# Patient Record
Sex: MALE | Race: WHITE | Employment: OTHER | URBAN - METROPOLITAN AREA
[De-identification: names, ages, dates, MRNs, and addresses within clinical notes are randomized per-mention and may not be internally consistent; named-entity substitution may affect disease eponyms.]

---

## 2019-07-02 ENCOUNTER — NURSING HOME VISIT (OUTPATIENT)
Dept: GERIATRICS | Facility: CLINIC | Age: 84
End: 2019-07-02
Payer: MEDICARE

## 2019-07-02 VITALS
TEMPERATURE: 98.5 F | OXYGEN SATURATION: 95 % | BODY MASS INDEX: 23.2 KG/M2 | DIASTOLIC BLOOD PRESSURE: 78 MMHG | HEIGHT: 67 IN | WEIGHT: 147.8 LBS | RESPIRATION RATE: 20 BRPM | HEART RATE: 76 BPM | SYSTOLIC BLOOD PRESSURE: 131 MMHG

## 2019-07-02 DIAGNOSIS — R53.81 DEBILITY: ICD-10-CM

## 2019-07-02 DIAGNOSIS — N40.0 BENIGN PROSTATIC HYPERPLASIA WITHOUT LOWER URINARY TRACT SYMPTOMS: ICD-10-CM

## 2019-07-02 DIAGNOSIS — R33.8 ACUTE RETENTION OF URINE: ICD-10-CM

## 2019-07-02 DIAGNOSIS — Z97.8 FOLEY CATHETER IN PLACE: ICD-10-CM

## 2019-07-02 DIAGNOSIS — Z93.3 S/P COLOSTOMY (H): ICD-10-CM

## 2019-07-02 DIAGNOSIS — I10 ESSENTIAL HYPERTENSION: ICD-10-CM

## 2019-07-02 DIAGNOSIS — G89.18 ACUTE POST-OPERATIVE PAIN: ICD-10-CM

## 2019-07-02 DIAGNOSIS — Z98.890 S/P ROBOT-ASSISTED SURGICAL PROCEDURE: ICD-10-CM

## 2019-07-02 DIAGNOSIS — C80.1 SIGNET RING CELL ADENOCARCINOMA (H): Primary | ICD-10-CM

## 2019-07-02 PROCEDURE — 99310 SBSQ NF CARE HIGH MDM 45: CPT | Performed by: NURSE PRACTITIONER

## 2019-07-02 RX ORDER — ONDANSETRON 4 MG/1
4 TABLET, FILM COATED ORAL EVERY 6 HOURS PRN
Start: 2019-07-02

## 2019-07-02 RX ORDER — HYDROCHLOROTHIAZIDE 25 MG/1
25 TABLET ORAL DAILY
COMMUNITY

## 2019-07-02 RX ORDER — ASPIRIN 81 MG/1
81 TABLET ORAL DAILY
COMMUNITY

## 2019-07-02 RX ORDER — ACETAMINOPHEN 500 MG
1000 TABLET ORAL EVERY 6 HOURS PRN
COMMUNITY

## 2019-07-02 RX ORDER — PRAVASTATIN SODIUM 10 MG
20 TABLET ORAL DAILY
COMMUNITY

## 2019-07-02 RX ORDER — LOSARTAN POTASSIUM 100 MG/1
100 TABLET ORAL DAILY
COMMUNITY

## 2019-07-02 RX ORDER — VERAPAMIL HYDROCHLORIDE 120 MG/1
120 TABLET ORAL EVERY 12 HOURS
COMMUNITY

## 2019-07-02 RX ORDER — SENNOSIDES 8.6 MG
1 TABLET ORAL 2 TIMES DAILY
Start: 2019-07-02 | End: 2019-07-08

## 2019-07-02 RX ORDER — POLYETHYLENE GLYCOL 3350 17 G/17G
1 POWDER, FOR SOLUTION ORAL DAILY PRN
COMMUNITY

## 2019-07-02 RX ORDER — MULTIPLE VITAMINS W/ MINERALS TAB 9MG-400MCG
1 TAB ORAL DAILY
COMMUNITY

## 2019-07-02 RX ORDER — OMEPRAZOLE 40 MG/1
40 CAPSULE, DELAYED RELEASE ORAL DAILY
COMMUNITY

## 2019-07-02 RX ORDER — TAMSULOSIN HYDROCHLORIDE 0.4 MG/1
0.4 CAPSULE ORAL DAILY
COMMUNITY

## 2019-07-02 ASSESSMENT — MIFFLIN-ST. JEOR: SCORE: 1289.05

## 2019-07-02 NOTE — PROGRESS NOTES
[place] [facility]  PRIMARY CARE PROVIDER AND CLINIC:  Physician No Ref-Primary, No address on file  Chief Complaint   Patient presents with     Hospital F/U     [place] Medical Record Number:  [id number]  Place of Service where encounter took place:  [facility] ([facility] [[id number]]    David CISNEROS Peña  is a 89 year old  (7/23/1928), admitted to the above facility from  [facility] . Hospital stay 6/25/2019 through 7/1/2019..  Admitted to this facility for  rehab, medical management and nursing care.    HPI:    HPI information obtained from: facility chart records, facility staff, patient report and [facility] chart review.     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  Met with several teams and underwent Robotic assisted abdominal perirectal tumor resection surgery and creation of a end sigmoid colostomy.  He has been doing well, thus has transitioned to the TCU for ongoing cares and PT/OT.     Updates on Status Since Skilled nursing Admission: In meeting Dr. Pompa and his son/family today, he reports he feels well.  He reports minimal surgical pain in the perirectal area, minimal surgical pain with the colostomy.  He reports he wishes he did not need the Cates but reported he failed a voiding trial in the hospital.  He reports also he has had extensive gas in his colostomy but minimal to no stool output since surgery but denies any N/V or abdominal pain.  Did mention he underwent a bowel prep prior to surgery.  No other medical complaints.     CODE STATUS/ADVANCE DIRECTIVES DISCUSSION:   DNR/DNI.   Patient's living condition: lives with spouse in [facility]    ALLERGIES: Adhesive tape and Lisinopril  PAST MEDICAL HISTORY:  has no past medical  "history on file.  PAST SURGICAL HISTORY:   has no past surgical history on file.  FAMILY HISTORY: family history is not on file.  SOCIAL HISTORY:       Post Discharge Medication Reconciliation Status: discharge medications reconciled and changed, per note/orders (see AVS)    Current Outpatient Medications   Medication Sig Dispense Refill     acetaminophen (TYLENOL) 500 MG tablet Take 1,000 mg by mouth every 6 hours as needed for mild pain       aspirin 81 MG EC tablet Take 81 mg by mouth daily       enoxaparin (LOVENOX) 30 MG/0.3ML syringe Inject 1 mg/kg Subcutaneous every 24 hours       hydrochlorothiazide (HYDRODIURIL) 25 MG tablet Take 25 mg by mouth daily       losartan (COZAAR) 100 MG tablet Take 100 mg by mouth daily       multivitamin w/minerals (MULTI-VITAMIN) tablet Take 1 tablet by mouth daily       omeprazole (PRILOSEC) 40 MG DR capsule Take 40 mg by mouth daily       ondansetron (ZOFRAN) 4 MG tablet Take 1 tablet (4 mg) by mouth every 6 hours as needed for nausea or vomiting       polyethylene glycol (MIRALAX/GLYCOLAX) packet Take 1 packet by mouth daily       pravastatin (PRAVACHOL) 10 MG tablet Take 20 mg by mouth daily       sennosides (SENOKOT) 8.6 MG tablet Take 1 tablet by mouth 2 times daily       tamsulosin (FLOMAX) 0.4 MG capsule Take 0.4 mg by mouth daily       verapamil (CALAN) 120 MG tablet Take 120 mg by mouth every 12 hours       ROS:  7 point ROS done including, light headedness/dizziness, fever/chills, pain, Resp, CV, GI, and  and is negative other than noted in HPI.      Vitals:  /78   Pulse 76   Temp 98.5  F (36.9  C)   Resp 20   Ht 1.702 m (5' 7\")   Wt 67 kg (147 lb 12.8 oz)   SpO2 95%   BMI 23.15 kg/m       Exam:  GENERAL APPEARANCE:  Elderly male resting in bed, NAD, non-toxic.  ENT:  Mouth and oropharynx normal, moist mucous membranes.   RESP:  Lungs CTA.  Regular relaxed breathing effort.  No cough.   CV: S1/S2 no murmur or rubs.  Regular rhythm and rate, few extra " beats.  No generalized edema.   ABDOMEN:   Flat, soft, non-tender with hypo-active bowel sounds.  No guarding, rigidity, or rebound tenderness.  New colostomy present with budded/swollen ostomy, which is pink in color.  Scant brown/pink fluid in ostomy bag.   EXTREMITIES:  No lower extremity edema, no calf tenderness.   PSYCH: Alert and orientated, pleasant and cooperative.   WOUNDS:  Several laproscopic sites on abdomen, all approximated, no s/s of infection.   Left abdominal old JEROME site which is not healed, but closed, no s/s of infection.   Perirectal incision, roughly 10 cm long, approximated with stitches, no s/s of infection.     Lab/Diagnostic data:  I have personally reviewed labs, which are in facility or EMR chart.     ASSESSMENT/PLAN:  Signet ring cell adenocarcinoma (H)  S/P colostomy (H)  S/P robot-assisted surgical procedure  Acute post-operative pain  Incision on perirectal side appears well.  Laproscopic abdominal sites appear well.  Old JEROME Left abdominal side is not approximated but no s/s of infection.    New Colostomy site appears well, ostomy is budded and pink, appears slightly swollen.  Minimal/scant ostomy drainage.    Concern is he reports lots of gas in ostomy, no stool output since surgery 6/25.  Per family, they report the spoke with Surgical team, they concur with starting Senna.  I note he underwent bowel prep prior to surgery, and on low residual diet, and with negative abdominal exam, no large concern at this time, but need to continue to monitor.   He is denying any pain, reports increased appetite.   -Continues PRN Acetaminophen for pain.   -Continues with daily Miralax, do NOT hold for loose stools at this time.   -Starting Senna 1 tab BID.   -Continues on low fiber diet.   -Lovelox subcutaneous DVT prophylaxis 30 days, 30 July last day.   --Colostomy exchanges 2/week Mon/Thurs.   --Ostomy RN to teach him self cares.   --Asked TCU to get him Roho or other cushioning.   --Surgical  f/u 7/16.     Essential hypertension  Review of VS's show VSS and within acceptable range.   No current s/s of clinical CHF.   -No changes, continue to clinically monitor.     Benign prostatic hyperplasia without lower urinary tract symptoms  Acute retention of urine  Cates catheter in place  Has a h/o urine retention/BPH, but reports he was doing well/asymptomatic until biopsy 6/6 and ongoing retention issues since.  Required Cates in hospital, he reports he failed a voiding trial, thus started on Flomax and discharged to us with indwelling Cates.   -Continues with Cates for now.  -Continues with Flomax for now.  -Will attempt voiding trial Monday 8 July.   --If fails, will UCI and send to Urology.     Debility  -PT/OT to eval and treat.     Orders written by provider at facility  -As noted above.     Total time spent with patient visit at the skilled nursing facility was >35 min including patient visit and review of past records, meeting with POA/family at bedside. Greater than 50% of total time spent with counseling and coordinating care due to admission/establish new care, review of HPI, development of POC, patient education and review of POC with pt and POA.      Electronically signed by:  RADHA Rubalcava CNP

## 2019-07-02 NOTE — LETTER
7/2/2019        RE: David Pompa  [address] [facility] [address]        [facility]  PRIMARY CARE PROVIDER AND CLINIC:  Physician No Ref-Primary, No address on file  Chief Complaint   Patient presents with     Hospital F/U     [place] Medical Record Number:  [id number]  Place of Service where encounter took place:  [facility] [[id number]]    David Pompa  is a 89 year old  (7/23/1928), admitted to the above facility from  [facility] . Hospital stay 6/25/2019 through 7/1/2019..  Admitted to this facility for  rehab, medical management and nursing care.    HPI:    HPI information obtained from: facility chart records, facility staff, patient report and [facility] chart review.     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  Met with several teams and underwent Robotic assisted abdominal perirectal tumor resection surgery and creation of a end sigmoid colostomy.  He has been doing well, thus has transitioned to the TCU for ongoing cares and PT/OT.     Updates on Status Since Skilled nursing Admission: In meeting Dr. Pompa and his son/family today, he reports he feels well.  He reports minimal surgical pain in the perirectal area, minimal surgical pain with the colostomy.  He reports he wishes he did not need the Cates but reported he failed a voiding trial in the hospital.  He reports also he has had extensive gas in his colostomy but minimal to no stool output since surgery but denies any N/V or abdominal pain.  Did mention he underwent a bowel prep prior to surgery.  No other medical complaints.     CODE STATUS/ADVANCE DIRECTIVES DISCUSSION:   DNR/DNI.   Patient's living condition: lives with spouse in [facility] independent  "living    ALLERGIES: Adhesive tape and Lisinopril  PAST MEDICAL HISTORY:  has no past medical history on file.  PAST SURGICAL HISTORY:   has no past surgical history on file.  FAMILY HISTORY: family history is not on file.  SOCIAL HISTORY:       Post Discharge Medication Reconciliation Status: discharge medications reconciled and changed, per note/orders (see AVS)    Current Outpatient Medications   Medication Sig Dispense Refill     acetaminophen (TYLENOL) 500 MG tablet Take 1,000 mg by mouth every 6 hours as needed for mild pain       aspirin 81 MG EC tablet Take 81 mg by mouth daily       enoxaparin (LOVENOX) 30 MG/0.3ML syringe Inject 1 mg/kg Subcutaneous every 24 hours       hydrochlorothiazide (HYDRODIURIL) 25 MG tablet Take 25 mg by mouth daily       losartan (COZAAR) 100 MG tablet Take 100 mg by mouth daily       multivitamin w/minerals (MULTI-VITAMIN) tablet Take 1 tablet by mouth daily       omeprazole (PRILOSEC) 40 MG DR capsule Take 40 mg by mouth daily       ondansetron (ZOFRAN) 4 MG tablet Take 1 tablet (4 mg) by mouth every 6 hours as needed for nausea or vomiting       polyethylene glycol (MIRALAX/GLYCOLAX) packet Take 1 packet by mouth daily       pravastatin (PRAVACHOL) 10 MG tablet Take 20 mg by mouth daily       sennosides (SENOKOT) 8.6 MG tablet Take 1 tablet by mouth 2 times daily       tamsulosin (FLOMAX) 0.4 MG capsule Take 0.4 mg by mouth daily       verapamil (CALAN) 120 MG tablet Take 120 mg by mouth every 12 hours       ROS:  7 point ROS done including, light headedness/dizziness, fever/chills, pain, Resp, CV, GI, and  and is negative other than noted in HPI.      Vitals:  /78   Pulse 76   Temp 98.5  F (36.9  C)   Resp 20   Ht 1.702 m (5' 7\")   Wt 67 kg (147 lb 12.8 oz)   SpO2 95%   BMI 23.15 kg/m        Exam:  GENERAL APPEARANCE:  Elderly male resting in bed, NAD, non-toxic.  ENT:  Mouth and oropharynx normal, moist mucous membranes.   RESP:  Lungs CTA.  Regular relaxed " breathing effort.  No cough.   CV: S1/S2 no murmur or rubs.  Regular rhythm and rate, few extra beats.  No generalized edema.   ABDOMEN:   Flat, soft, non-tender with hypo-active bowel sounds.  No guarding, rigidity, or rebound tenderness.  New colostomy present with budded/swollen ostomy, which is pink in color.  Scant brown/pink fluid in ostomy bag.   EXTREMITIES:  No lower extremity edema, no calf tenderness.   PSYCH: Alert and orientated, pleasant and cooperative.   WOUNDS:  Several laproscopic sites on abdomen, all approximated, no s/s of infection.   Left abdominal old JEROME site which is not healed, but closed, no s/s of infection.   Perirectal incision, roughly 10 cm long, approximated with stitches, no s/s of infection.     Lab/Diagnostic data:  I have personally reviewed labs, which are in facility or EMR chart.     ASSESSMENT/PLAN:  Signet ring cell adenocarcinoma (H)  S/P colostomy (H)  S/P robot-assisted surgical procedure  Acute post-operative pain  Incision on perirectal side appears well.  Laproscopic abdominal sites appear well.  Old JEROME Left abdominal side is not approximated but no s/s of infection.    New Colostomy site appears well, ostomy is budded and pink, appears slightly swollen.  Minimal/scant ostomy drainage.    Concern is he reports lots of gas in ostomy, no stool output since surgery 6/25.  Per family, they report the spoke with Surgical team, they concur with starting Senna.  I note he underwent bowel prep prior to surgery, and on low residual diet, and with negative abdominal exam, no large concern at this time, but need to continue to monitor.   He is denying any pain, reports increased appetite.   -Continues PRN Acetaminophen for pain.   -Continues with daily Miralax, do NOT hold for loose stools at this time.   -Starting Senna 1 tab BID.   -Continues on low fiber diet.   -Lovelox subcutaneous DVT prophylaxis 30 days, 30 July last day.   --Colostomy exchanges 2/week Mon/Thurs.    --Ostomy RN to teach him self cares.   --Asked TCU to get him Roho or other cushioning.   --Surgical f/u 7/16.     Essential hypertension  Review of VS's show VSS and within acceptable range.   No current s/s of clinical CHF.   -No changes, continue to clinically monitor.     Benign prostatic hyperplasia without lower urinary tract symptoms  Acute retention of urine  Cates catheter in place  Has a h/o urine retention/BPH, but reports he was doing well/asymptomatic until biopsy 6/6 and ongoing retention issues since.  Required Cates in hospital, he reports he failed a voiding trial, thus started on Flomax and discharged to us with indwelling Cates.   -Continues with Cates for now.  -Continues with Flomax for now.  -Will attempt voiding trial Monday 8 July.   --If fails, will UCI and send to Urology.     Debility  -PT/OT to eval and treat.     Orders written by provider at facility  -As noted above.     Total time spent with patient visit at the skilled nursing facility was >35 min including patient visit and review of past records, meeting with POA/family at bedside. Greater than 50% of total time spent with counseling and coordinating care due to admission/establish new care, review of HPI, development of POC, patient education and review of POC with pt and POA.      Electronically signed by:  RADHA Rubalcava CNP                 Sincerely,        RADHA Rubalcava CNP

## 2019-07-03 ENCOUNTER — DOCUMENTATION ONLY (OUTPATIENT)
Dept: OTHER | Facility: CLINIC | Age: 84
End: 2019-07-03

## 2019-07-03 NOTE — PROGRESS NOTES
[place] [facility]  [place] Medical Record Number:  [id number]  Place of Service where encounter took place:  [facility] [[id number]]  Chief Complaint   Patient presents with     Hospital F/U       HPI:    David CISNEROS Peña  is a 89 year old (7/23/1928), who is being seen today for an episodic care visit.  HPI information obtained from: {[facility] HPI:101547}. Today's concern is:  {[facility] DX:649975}    Past Medical and Surgical History reviewed in Epic today.    MEDICATIONS:  Current Outpatient Medications   Medication Sig Dispense Refill     acetaminophen (TYLENOL) 500 MG tablet Take 1,000 mg by mouth every 6 hours as needed for mild pain       aspirin 81 MG EC tablet Take 81 mg by mouth daily       enoxaparin (LOVENOX) 30 MG/0.3ML syringe Inject 1 mg/kg Subcutaneous every 24 hours       hydrochlorothiazide (HYDRODIURIL) 25 MG tablet Take 25 mg by mouth daily       losartan (COZAAR) 100 MG tablet Take 100 mg by mouth daily       multivitamin w/minerals (MULTI-VITAMIN) tablet Take 1 tablet by mouth daily       omeprazole (PRILOSEC) 40 MG DR capsule Take 40 mg by mouth daily       ondansetron (ZOFRAN) 4 MG tablet Take 1 tablet (4 mg) by mouth every 6 hours as needed for nausea or vomiting       polyethylene glycol (MIRALAX/GLYCOLAX) packet Take 1 packet by mouth daily       pravastatin (PRAVACHOL) 10 MG tablet Take 20 mg by mouth daily       sennosides (SENOKOT) 8.6 MG tablet Take 1 tablet by mouth 2 times daily       tamsulosin (FLOMAX) 0.4 MG capsule Take 0.4 mg by mouth daily       verapamil (CALAN) 120 MG tablet Take 120 mg by mouth every 12 hours       ***    REVIEW OF SYSTEMS:  {ROS [facility]GS:356829}    Objective:  There were no vitals taken for this visit.  Exam:  {senior care physical exam :684421}    Labs:   {fgslab:200782}    ASSESSMENT/PLAN:  {[facility] DX:806523}    {fgsorders:685294}  ***    {[facility] TIME SPENT:976445}  Electronically signed by:  Roz Clemente CNA ***  {Providers  Please double check the med list (in the plan section >> meds & orders tab) and Discontinue any of the meds flagged by the TC to be discontinued}

## 2019-07-05 ENCOUNTER — NURSING HOME VISIT (OUTPATIENT)
Dept: GERIATRICS | Facility: CLINIC | Age: 84
End: 2019-07-05
Payer: MEDICARE

## 2019-07-05 VITALS
TEMPERATURE: 97.6 F | WEIGHT: 141.4 LBS | RESPIRATION RATE: 17 BRPM | HEART RATE: 76 BPM | DIASTOLIC BLOOD PRESSURE: 65 MMHG | SYSTOLIC BLOOD PRESSURE: 124 MMHG | BODY MASS INDEX: 22.19 KG/M2 | HEIGHT: 67 IN | OXYGEN SATURATION: 96 %

## 2019-07-05 DIAGNOSIS — N40.1 BENIGN PROSTATIC HYPERPLASIA WITH URINARY RETENTION: ICD-10-CM

## 2019-07-05 DIAGNOSIS — C20 RECTAL CANCER (H): ICD-10-CM

## 2019-07-05 DIAGNOSIS — Z93.3 COLOSTOMY IN PLACE (H): ICD-10-CM

## 2019-07-05 DIAGNOSIS — K59.01 SLOW TRANSIT CONSTIPATION: ICD-10-CM

## 2019-07-05 DIAGNOSIS — D64.9 ANEMIA, UNSPECIFIED TYPE: ICD-10-CM

## 2019-07-05 DIAGNOSIS — Z48.3 AFTERCARE FOLLOWING SURGERY FOR CANCER OR TUMOR: Primary | ICD-10-CM

## 2019-07-05 DIAGNOSIS — R33.8 ACUTE URINARY RETENTION: ICD-10-CM

## 2019-07-05 DIAGNOSIS — R33.8 BENIGN PROSTATIC HYPERPLASIA WITH URINARY RETENTION: ICD-10-CM

## 2019-07-05 DIAGNOSIS — R53.81 PHYSICAL DECONDITIONING: ICD-10-CM

## 2019-07-05 DIAGNOSIS — I12.9 BENIGN HYPERTENSIVE KIDNEY DISEASE WITH CHRONIC KIDNEY DISEASE STAGE I THROUGH STAGE IV, OR UNSPECIFIED: ICD-10-CM

## 2019-07-05 DIAGNOSIS — E78.5 HYPERLIPIDEMIA, UNSPECIFIED HYPERLIPIDEMIA TYPE: ICD-10-CM

## 2019-07-05 DIAGNOSIS — K21.9 GASTROESOPHAGEAL REFLUX DISEASE WITHOUT ESOPHAGITIS: ICD-10-CM

## 2019-07-05 PROCEDURE — 99306 1ST NF CARE HIGH MDM 50: CPT | Performed by: INTERNAL MEDICINE

## 2019-07-05 ASSESSMENT — MIFFLIN-ST. JEOR: SCORE: 1260.02

## 2019-07-05 NOTE — LETTER
7/5/2019        RE: David Pompa  [address] [facility] [facility] [address] [facility] [address]        [place] [facility]  INITIAL VISIT NOTE  July 5, 2019    [facility] [address] [facility] [address] / [facility] [address]*    Chief Complaint   Patient presents with     Hospital F/U       HPI:    David Pompa is a 89 year old  (7/23/1928) male [facility] an initial [facility]. Medical history is notable for HTN, PMR, BPH and CKD III. He was hospitalized at [facility] from 6/25/19 to 7/1/19 where he is s/p robotic assisted abdominoperineal resection of rectal cancer. Surgery without complication. Developed urinary retention and a Cates was placed. He failed one trial of void in the hospital. He was admitted to this facility for medical management and rehab.     Today Dr. Pompa is seen in his room. He is a retired ophthalmologist. Overall he is doing well. Has two questions/concerns today. The first is about titrating bowel meds. He is now having a small amount of formed stool. He does not want to over correct and have too loose of stool. Also concerned that nursing brought to his attention a reddened area on his coccyx at the top of his incision. He is otherwise doing well. No chest pain or dyspnea. No abdominal pain. Learning ostomy cares. Is OK with learning to do enoxaparin injections. Appetite is good. Sleeping is fair. Walked 2000 steps yesterday (has a pedometer in his pocket). No concerns today per discussion with nursing. Working with therapies.     CODE STATUS:   DNR / DNI    ALLERGIES:     Allergies   Allergen Reactions     Adhesive Tape      Lisinopril        PAST MEDICAL HISTORY:   HTN, PMR, BPH and CKD III    PAST SURGICAL HISTORY:   No past surgical history on file.    FAMILY HISTORY:   Reviewed and non-contributory    SOCIAL HISTORY:   Lives with spouse     MEDICATIONS:  Current Outpatient Medications   Medication  "Sig Dispense Refill     acetaminophen (TYLENOL) 500 MG tablet Take 1,000 mg by mouth every 6 hours as needed for mild pain       aspirin 81 MG EC tablet Take 81 mg by mouth daily       enoxaparin (LOVENOX) 30 MG/0.3ML syringe Inject 1 mg/kg Subcutaneous every 24 hours       hydrochlorothiazide (HYDRODIURIL) 25 MG tablet Take 25 mg by mouth daily       losartan (COZAAR) 100 MG tablet Take 100 mg by mouth daily       multivitamin w/minerals (MULTI-VITAMIN) tablet Take 1 tablet by mouth daily       omeprazole (PRILOSEC) 40 MG DR capsule Take 40 mg by mouth daily       ondansetron (ZOFRAN) 4 MG tablet Take 1 tablet (4 mg) by mouth every 6 hours as needed for nausea or vomiting       polyethylene glycol (MIRALAX/GLYCOLAX) packet Take 1 packet by mouth daily       pravastatin (PRAVACHOL) 10 MG tablet Take 20 mg by mouth daily       sennosides (SENOKOT) 8.6 MG tablet Take 1 tablet by mouth 2 times daily       tamsulosin (FLOMAX) 0.4 MG capsule Take 0.4 mg by mouth daily       verapamil (CALAN) 120 MG tablet Take 120 mg by mouth every 12 hours         ROS:  10 point ROS neg other than the symptoms noted above in the HPI.    PHYSICAL EXAM:  /65   Pulse 76   Temp 97.6  F (36.4  C)   Resp 17   Ht 1.702 m (5' 7\")   Wt 64.1 kg (141 lb 6.4 oz)   SpO2 96%   BMI 22.15 kg/m      Gen: sitting in chair, alert, cooperative and in no acute distress  HEENT: normocephalic; oropharynx clear  Card: RRR, S1, S2, no murmurs  Resp: lungs clear to auscultation bilaterally, no crackles or wheezes  GI: abdomen soft, not-tender, ostomy in place on L side of abdomen; bag with stool that was brown dipti bit pasty  MSK: normal muscle tone, no LE edema  Neuro: CX II-XII grossly in tact; ROM in all four extremities grossly in tact  Psych: alert and oriented x3; normal affect  Skin: perianal incision is healing well, some erythema around his coccyx that is more consistent with early pressure wound and not infection     LABORATORY/IMAGING " DATA:  Reviewed as per Epic    ASSESSMENT/PLAN:    Rectal Cancer s/p Robotic Assisted Abdominoperineal Resection (6/25/19)  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 One of 17 LN was positive. Surgery without complication. New colostomy. Pain is controlled. Incision itself looks very good; however, some early pressure injury on coccyx  -- analgesia with APAP 1000 mg q6h PRN  -- DVT ppx with enoxaparin x30 days per colorectal surgery  -- wound and ostomy cares as ordered  -- follow up in surgery clinic as scheduled on 7/16/19  -- order placed today for a softer mattress given concern for development of pressure area  -- discussed sitting positions to offload his coccyx     Slow Transit Constipation  New colostomy. Had a bowel prep pre-op. Started on senna BID on 7/2/19 as was not passing stool, just gas. Now starting to pass formed stool  -- continues on Miralax 17g daily and senna 1 tab BID  -- adjust bowel regimen as needed - discussed would not want to make any changes right now, OK for stool to be more on the loose end while he heals and stoma matures    Acute Urinary Retention  BPH  Also history of prostatitis. Had Cates placed during hospitalization, failed a trial of void and was discharged with a Cates. New on tamsulosin.   -- continues on tamsulosin 0.4 mg daily  -- routine Cates cares  -- trial of void at TCU on 7/8 - if fails will need urology appointment    HTN  SBPs 120s-130s  -- continues on hydrochlorothiazide 25 mg daily, losartan 100 mg daily and verapamil 120 mg q12h   -- follow BPs and adjust medications as needed    CKD, Stage III  Baseline Cr 1.7-1.8  -- avoid nephrotoxic meds  -- periodic BMP    Chronic Anemia  Baseline Hgb around 11. No signs of bleeding. Likely anemia of chronic disease  -- follow clinically      HLD  -- continues on pravastatin 20 mg daily    GERD  -- continues on omeprazole 40 mg daily    Physical Deconditioning  In setting of hospitalization  and underlying medical conditions  -- ongoing PT/OT      FGS TIME SPENT: Total time spent with patient visit at the [facility] was >45 min including patient visit, review of past records and discussion with bedside nurse and NP. Greater than 50% of total time spent with counseling and coordinating care due to MD initial visit;l new area raising concern for pressure wound/beakdown and need to mitigate this immediately, regulation of bowels, recommended follow up of surgery, urology, PCP; discharge planning re: needs prior to discharge (ie. able to do ostomy cares, give enoxaparin shots) and needs for going home( ie. home RN, etc)      Electronically signed by:  Rukhsana Bell MD              Sincerely,        Rukhsana Bell MD

## 2019-07-05 NOTE — PROGRESS NOTES
[place] [facility]  INITIAL VISIT NOTE  July 5, 2019    PRIMARY CARE PROVIDER AND CLINIC:  [facility] [address] [facility] / [facility]*    Chief Complaint   Patient presents with     Hospital F/U       HPI:    David Pompa is a 89 year old  (7/23/1928) male who [facility] [place] [facility] for an initial visit. Medical history is notable for HTN, PMR, BPH and CKD III. He was hospitalized at [facility] from 6/25/19 to 7/1/19 where he is s/p robotic assisted abdominoperineal resection of rectal cancer. Surgery without complication. Developed urinary retention and a Cates was placed. He failed one trial of void in the hospital. He was admitted to this facility for medical management and rehab.     Today Dr. Pompa is seen in his room. He is a retired ophthalmologist. Overall he is doing well. Has two questions/concerns today. The first is about titrating bowel meds. He is now having a small amount of formed stool. He does not want to over correct and have too loose of stool. Also concerned that nursing brought to his attention a reddened area on his coccyx at the top of his incision. He is otherwise doing well. No chest pain or dyspnea. No abdominal pain. Learning ostomy cares. Is OK with learning to do enoxaparin injections. Appetite is good. Sleeping is fair. Walked 2000 steps yesterday (has a pedometer in his pocket). No concerns today per discussion with nursing. Working with therapies.     CODE STATUS:   DNR / DNI    ALLERGIES:     Allergies   Allergen Reactions     Adhesive Tape      Lisinopril        PAST MEDICAL HISTORY:   HTN, PMR, BPH and CKD III    PAST SURGICAL HISTORY:   No past surgical history on file.    FAMILY HISTORY:   Reviewed and non-contributory    SOCIAL HISTORY:   Lives with spouse     MEDICATIONS:  Current Outpatient Medications   Medication Sig Dispense Refill     acetaminophen (TYLENOL) 500 MG tablet Take 1,000 mg by mouth every  "6 hours as needed for mild pain       aspirin 81 MG EC tablet Take 81 mg by mouth daily       enoxaparin (LOVENOX) 30 MG/0.3ML syringe Inject 1 mg/kg Subcutaneous every 24 hours       hydrochlorothiazide (HYDRODIURIL) 25 MG tablet Take 25 mg by mouth daily       losartan (COZAAR) 100 MG tablet Take 100 mg by mouth daily       multivitamin w/minerals (MULTI-VITAMIN) tablet Take 1 tablet by mouth daily       omeprazole (PRILOSEC) 40 MG DR capsule Take 40 mg by mouth daily       ondansetron (ZOFRAN) 4 MG tablet Take 1 tablet (4 mg) by mouth every 6 hours as needed for nausea or vomiting       polyethylene glycol (MIRALAX/GLYCOLAX) packet Take 1 packet by mouth daily       pravastatin (PRAVACHOL) 10 MG tablet Take 20 mg by mouth daily       sennosides (SENOKOT) 8.6 MG tablet Take 1 tablet by mouth 2 times daily       tamsulosin (FLOMAX) 0.4 MG capsule Take 0.4 mg by mouth daily       verapamil (CALAN) 120 MG tablet Take 120 mg by mouth every 12 hours         ROS:  10 point ROS neg other than the symptoms noted above in the HPI.    PHYSICAL EXAM:  /65   Pulse 76   Temp 97.6  F (36.4  C)   Resp 17   Ht 1.702 m (5' 7\")   Wt 64.1 kg (141 lb 6.4 oz)   SpO2 96%   BMI 22.15 kg/m     Gen: sitting in chair, alert, cooperative and in no acute distress  HEENT: normocephalic; oropharynx clear  Card: RRR, S1, S2, no murmurs  Resp: lungs clear to auscultation bilaterally, no crackles or wheezes  GI: abdomen soft, not-tender, ostomy in place on L side of abdomen; bag with stool that was brown dipti bit pasty  MSK: normal muscle tone, no LE edema  Neuro: CX II-XII grossly in tact; ROM in all four extremities grossly in tact  Psych: alert and oriented x3; normal affect  Skin: perianal incision is healing well, some erythema around his coccyx that is more consistent with early pressure wound and not infection     LABORATORY/IMAGING DATA:  Reviewed as per Epic    ASSESSMENT/PLAN:    Rectal Cancer s/p Robotic Assisted " Abdominoperineal Resection (6/25/19)  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 One of 17 LN was positive. Surgery without complication. New colostomy. Pain is controlled. Incision itself looks very good; however, some early pressure injury on coccyx  -- analgesia with APAP 1000 mg q6h PRN  -- DVT ppx with enoxaparin x30 days per colorectal surgery  -- wound and ostomy cares as ordered  -- follow up in surgery clinic as scheduled on 7/16/19  -- order placed today for a softer mattress given concern for development of pressure area  -- discussed sitting positions to offload his coccyx     Slow Transit Constipation  New colostomy. Had a bowel prep pre-op. Started on senna BID on 7/2/19 as was not passing stool, just gas. Now starting to pass formed stool  -- continues on Miralax 17g daily and senna 1 tab BID  -- adjust bowel regimen as needed - discussed would not want to make any changes right now, OK for stool to be more on the loose end while he heals and stoma matures    Acute Urinary Retention  BPH  Also history of prostatitis. Had Cates placed during hospitalization, failed a trial of void and was discharged with a Cates. New on tamsulosin.   -- continues on tamsulosin 0.4 mg daily  -- routine Cates cares  -- trial of void at TCU on 7/8 - if fails will need urology appointment    HTN  SBPs 120s-130s  -- continues on hydrochlorothiazide 25 mg daily, losartan 100 mg daily and verapamil 120 mg q12h   -- follow BPs and adjust medications as needed    CKD, Stage III  Baseline Cr 1.7-1.8  -- avoid nephrotoxic meds  -- periodic BMP    Chronic Anemia  Baseline Hgb around 11. No signs of bleeding. Likely anemia of chronic disease  -- follow clinically      HLD  -- continues on pravastatin 20 mg daily    GERD  -- continues on omeprazole 40 mg daily    Physical Deconditioning  In setting of hospitalization and underlying medical conditions  -- ongoing PT/OT      FGS TIME SPENT: Total time  spent with patient visit at the [facility] nursing [facility] was >45 min including patient visit, review of past records and discussion with bedside nurse and NP. Greater than 50% of total time spent with counseling and coordinating care due to MD initial visit;l new area raising concern for pressure wound/beakdown and need to mitigate this immediately, regulation of bowels, recommended follow up of surgery, urology, PCP; discharge planning re: needs prior to discharge (ie. able to do ostomy cares, give enoxaparin shots) and needs for going home( ie. home RN, etc)      Electronically signed by:  Rukhsana Bell MD

## 2019-07-08 ENCOUNTER — NURSING HOME VISIT (OUTPATIENT)
Dept: GERIATRICS | Facility: CLINIC | Age: 84
End: 2019-07-08
Payer: MEDICARE

## 2019-07-08 ENCOUNTER — HOSPITAL LABORATORY (OUTPATIENT)
Dept: OTHER | Facility: CLINIC | Age: 84
End: 2019-07-08

## 2019-07-08 VITALS
SYSTOLIC BLOOD PRESSURE: 127 MMHG | DIASTOLIC BLOOD PRESSURE: 74 MMHG | TEMPERATURE: 97.2 F | OXYGEN SATURATION: 97 % | HEART RATE: 82 BPM | RESPIRATION RATE: 17 BRPM

## 2019-07-08 DIAGNOSIS — R33.8 BENIGN PROSTATIC HYPERPLASIA WITH URINARY RETENTION: ICD-10-CM

## 2019-07-08 DIAGNOSIS — N40.1 BENIGN PROSTATIC HYPERPLASIA WITH URINARY RETENTION: ICD-10-CM

## 2019-07-08 DIAGNOSIS — Z98.890 S/P ROBOT-ASSISTED SURGICAL PROCEDURE: Primary | ICD-10-CM

## 2019-07-08 DIAGNOSIS — R33.8 ACUTE URINARY RETENTION: ICD-10-CM

## 2019-07-08 DIAGNOSIS — Z93.3 S/P COLOSTOMY (H): ICD-10-CM

## 2019-07-08 DIAGNOSIS — I10 ESSENTIAL HYPERTENSION: ICD-10-CM

## 2019-07-08 DIAGNOSIS — Z93.3 COLOSTOMY IN PLACE (H): ICD-10-CM

## 2019-07-08 DIAGNOSIS — R53.81 DEBILITY: ICD-10-CM

## 2019-07-08 LAB
ANION GAP SERPL CALCULATED.3IONS-SCNC: 10 MMOL/L (ref 3–14)
BUN SERPL-MCNC: 45 MG/DL (ref 7–30)
CALCIUM SERPL-MCNC: 8.9 MG/DL (ref 8.5–10.1)
CHLORIDE SERPL-SCNC: 104 MMOL/L (ref 94–109)
CO2 SERPL-SCNC: 24 MMOL/L (ref 20–32)
CREAT SERPL-MCNC: 1.85 MG/DL (ref 0.66–1.25)
ERYTHROCYTE [DISTWIDTH] IN BLOOD BY AUTOMATED COUNT: 14.1 % (ref 10–15)
GFR SERPL CREATININE-BSD FRML MDRD: 31 ML/MIN/{1.73_M2}
GLUCOSE SERPL-MCNC: 117 MG/DL (ref 70–99)
HCT VFR BLD AUTO: 33.1 % (ref 40–53)
HGB BLD-MCNC: 10.9 G/DL (ref 13.3–17.7)
MCH RBC QN AUTO: 30.4 PG (ref 26.5–33)
MCHC RBC AUTO-ENTMCNC: 32.9 G/DL (ref 31.5–36.5)
MCV RBC AUTO: 92 FL (ref 78–100)
PLATELET # BLD AUTO: 317 10E9/L (ref 150–450)
POTASSIUM SERPL-SCNC: 4.4 MMOL/L (ref 3.4–5.3)
RBC # BLD AUTO: 3.59 10E12/L (ref 4.4–5.9)
SODIUM SERPL-SCNC: 138 MMOL/L (ref 133–144)
WBC # BLD AUTO: 10.4 10E9/L (ref 4–11)

## 2019-07-08 PROCEDURE — 99309 SBSQ NF CARE MODERATE MDM 30: CPT | Performed by: NURSE PRACTITIONER

## 2019-07-08 RX ORDER — SENNOSIDES 8.6 MG
1 TABLET ORAL 2 TIMES DAILY PRN
Start: 2019-07-08

## 2019-07-08 NOTE — LETTER
7/8/2019        RE: Dvaid Pompa  [address] [address]        [place] [facility]  [place] [facility] Record Number:  [id number]  Place of Service where encounter took place:  [facility] [[id number]]  Chief Complaint   Patient presents with     RECHECK       HPI:    David Pompa  is a 89 year old (7/23/1928), who is being seen today for an episodic care visit.  HPI information obtained from: facility chart records, facility staff, patient report and [facility] chart review.     Met with Dr. Pompa today for follow up, along with his daughter-in law.  Dr. Pompa reports he had a good weekend, no complaints.  Endorses he continues to now have stool in his ostomy, pasty/thick, but good amounts.  He has no other complaints, is excited to get his voiding trial started later today.     Past Medical and Surgical History reviewed in Epic today.    MEDICATIONS:  Current Outpatient Medications   Medication Sig Dispense Refill     acetaminophen (TYLENOL) 500 MG tablet Take 1,000 mg by mouth every 6 hours as needed for mild pain       aspirin 81 MG EC tablet Take 81 mg by mouth daily       enoxaparin (LOVENOX) 30 MG/0.3ML syringe Inject 1 mg/kg Subcutaneous every 24 hours Until 7/30/19       hydrochlorothiazide (HYDRODIURIL) 25 MG tablet Take 25 mg by mouth daily       losartan (COZAAR) 100 MG tablet Take 100 mg by mouth daily       multivitamin w/minerals (MULTI-VITAMIN) tablet Take 1 tablet by mouth daily       omeprazole (PRILOSEC) 40 MG DR capsule Take 40 mg by mouth daily       ondansetron (ZOFRAN) 4 MG tablet Take 1 tablet (4 mg) by mouth every 6 hours as needed for nausea or vomiting       polyethylene glycol (MIRALAX/GLYCOLAX) packet Take 1 packet by mouth daily       pravastatin (PRAVACHOL) 10 MG tablet Take 20 mg by mouth daily       sennosides (SENOKOT) 8.6 MG tablet Take 1 tablet by mouth 2 times daily       tamsulosin (FLOMAX) 0.4 MG capsule Take  0.4 mg by mouth daily       verapamil (CALAN) 120 MG tablet Take 120 mg by mouth every 12 hours       REVIEW OF SYSTEMS:  7 point ROS done including, light headedness/dizziness, fever/chills, pain, Resp, CV, GI, and  and is negative other than noted in HPI.     Objective:  /74   Pulse 82   Temp 97.2  F (36.2  C)   Resp 17   SpO2 97%      Exam:  GENERAL APPEARANCE:  Elderly male resting in bed, NAD, non-toxic.  ENT:  Mouth and oropharynx normal, moist mucous membranes.   RESP:  Lungs  mild crackles in LLL, otherwise CTA.  Regular relaxed breathing effort.  No cough.   CV: S1/S2 no murmur or rubs.  Regular rhythm and rate, few extra beats.  No generalized edema.   ABDOMEN:   Flat, soft, non-tender with hypo-active bowel sounds.  No guarding, rigidity, or rebound tenderness.  New colostomy present, ostomy not visible today, good amount of brown pasty stool present.    EXTREMITIES:  No lower extremity edema, no calf tenderness.   PSYCH: Alert and orientated, pleasant and cooperative.   WOUNDS:  Several laproscopic sites on abdomen, all approximated, no s/s of infection.   Left abdominal old JEROME site which is not healed, not approximated, but no s/s of infection.   Perirectal incision, roughly 10 cm long, approximated with stitches, no s/s of infection.  Slightly reddened superior but no skin breakdown.     Labs:   None recent.     ASSESSMENT/PLAN:  S/P robot-assisted surgical procedure  S/P colostomy (H)  Colostomy in place (H)  Incision appears well, but he does have mild redness just superior to incision, no skin breakdown.  He declined/refused air mattress, to soft/noisy.  We did instruct him to keep using RoHo and off loading.   Starting to get good amount of pasty brown stool in ostomy, no pain, no blood present.   -Continue daily Miralax for now.  -Did changed Senna to PRN.   -Continue to clinically monitor.     Acute urinary retention  Benign prostatic hyperplasia with urinary retention  Acute on  chronic, but worse since biopsy 6 June.  Has been on Flomax now for roughly 1 week.  Will pull da silva and start voiding trial.   -Starting Voiding trial today.   -Continue PTA Flomax.   --If fail's will resume da silva and arrange for Urology f/u.     Essential hypertension  Review of VS's show VSS and within acceptable range.   No current s/s of clinical CHF.   -No changes, continue to clinically monitor.     Debility  -Continues with PT/OT.     Orders written by provider at facility  -As noted above.     Electronically signed by:  RADHA Rubalcava CNP           Sincerely,        RADHA Rubalcava CNP

## 2019-07-08 NOTE — PROGRESS NOTES
[place] [facility]  [place] [facility] Record Number:  [id number]  Place of Service where encounter took place:  [facility] [[id number]]  Chief Complaint   Patient presents with     RECHECK       HPI:    David Pompa  is a 89 year old (7/23/1928), who is being seen today for an episodic care visit.  HPI information obtained from: facility chart records, facility staff, patient report and [facility] chart review.     Met with Dr. Pompa today for follow up, along with his daughter-in law.  Dr. Pompa reports he had a good weekend, no complaints.  Endorses he continues to now have stool in his ostomy, pasty/thick, but good amounts.  He has no other complaints, is excited to get his voiding trial started later today.     Past Medical and Surgical History reviewed in Epic today.    MEDICATIONS:  Current Outpatient Medications   Medication Sig Dispense Refill     acetaminophen (TYLENOL) 500 MG tablet Take 1,000 mg by mouth every 6 hours as needed for mild pain       aspirin 81 MG EC tablet Take 81 mg by mouth daily       enoxaparin (LOVENOX) 30 MG/0.3ML syringe Inject 1 mg/kg Subcutaneous every 24 hours Until 7/30/19       hydrochlorothiazide (HYDRODIURIL) 25 MG tablet Take 25 mg by mouth daily       losartan (COZAAR) 100 MG tablet Take 100 mg by mouth daily       multivitamin w/minerals (MULTI-VITAMIN) tablet Take 1 tablet by mouth daily       omeprazole (PRILOSEC) 40 MG DR capsule Take 40 mg by mouth daily       ondansetron (ZOFRAN) 4 MG tablet Take 1 tablet (4 mg) by mouth every 6 hours as needed for nausea or vomiting       polyethylene glycol (MIRALAX/GLYCOLAX) packet Take 1 packet by mouth daily       pravastatin (PRAVACHOL) 10 MG tablet Take 20 mg by mouth daily       sennosides (SENOKOT) 8.6 MG tablet Take 1 tablet by mouth 2 times daily       tamsulosin (FLOMAX) 0.4 MG capsule Take 0.4 mg by mouth daily       verapamil (CALAN) 120 MG tablet Take 120 mg by mouth every 12  hours       REVIEW OF SYSTEMS:  7 point ROS done including, light headedness/dizziness, fever/chills, pain, Resp, CV, GI, and  and is negative other than noted in HPI.     Objective:  /74   Pulse 82   Temp 97.2  F (36.2  C)   Resp 17   SpO2 97%      Exam:  GENERAL APPEARANCE:  Elderly male resting in bed, NAD, non-toxic.  ENT:  Mouth and oropharynx normal, moist mucous membranes.   RESP:  Lungs mild crackles in LLL, otherwise CTA.  Regular relaxed breathing effort.  No cough.   CV: S1/S2 no murmur or rubs.  Regular rhythm and rate, few extra beats.  No generalized edema.   ABDOMEN:   Flat, soft, non-tender with hypo-active bowel sounds.  No guarding, rigidity, or rebound tenderness.  New colostomy present, ostomy not visible today, good amount of brown pasty stool present.    EXTREMITIES:  No lower extremity edema, no calf tenderness.   PSYCH: Alert and orientated, pleasant and cooperative.   WOUNDS:  Several laproscopic sites on abdomen, all approximated, no s/s of infection.   Left abdominal old JEROME site which is not healed, not approximated, but no s/s of infection.   Perirectal incision, roughly 10 cm long, approximated with stitches, no s/s of infection.  Slightly reddened superior but no skin breakdown.     Labs:   None recent.     ASSESSMENT/PLAN:  S/P robot-assisted surgical procedure  S/P colostomy (H)  Colostomy in place (H)  Incision appears well, but he does have mild redness just superior to incision, no skin breakdown.  He declined/refused air mattress, to soft/noisy.  We did instruct him to keep using RoHo and off loading.   Starting to get good amount of pasty brown stool in ostomy, no pain, no blood present.   -Continue daily Miralax for now.  -Did changed Senna to PRN.   -Continue to clinically monitor.     Acute urinary retention  Benign prostatic hyperplasia with urinary retention  Acute on chronic, but worse since biopsy 6 June.  Has been on Flomax now for roughly 1 week.  Will pull  da silva and start voiding trial.   -Starting Voiding trial today.   -Continue PTA Flomax.   --If fail's will resume da silva and arrange for Urology f/u.     Essential hypertension  Review of VS's show VSS and within acceptable range.   No current s/s of clinical CHF.   -No changes, continue to clinically monitor.     Debility  -Continues with PT/OT.     Orders written by provider at facility  -As noted above.     Electronically signed by:  RADHA Rubalcava CNP

## 2019-07-09 ENCOUNTER — NURSING HOME VISIT (OUTPATIENT)
Dept: GERIATRICS | Facility: CLINIC | Age: 84
End: 2019-07-09
Payer: MEDICARE

## 2019-07-09 VITALS
TEMPERATURE: 97.2 F | HEART RATE: 62 BPM | WEIGHT: 143.8 LBS | DIASTOLIC BLOOD PRESSURE: 67 MMHG | HEIGHT: 67 IN | OXYGEN SATURATION: 94 % | RESPIRATION RATE: 20 BRPM | BODY MASS INDEX: 22.57 KG/M2 | SYSTOLIC BLOOD PRESSURE: 144 MMHG

## 2019-07-09 DIAGNOSIS — Z97.8 FOLEY CATHETER IN PLACE: ICD-10-CM

## 2019-07-09 DIAGNOSIS — R33.8 ACUTE URINARY RETENTION: ICD-10-CM

## 2019-07-09 DIAGNOSIS — N40.1 BENIGN PROSTATIC HYPERPLASIA WITH URINARY RETENTION: ICD-10-CM

## 2019-07-09 DIAGNOSIS — R31.0 GROSS HEMATURIA: Primary | ICD-10-CM

## 2019-07-09 DIAGNOSIS — R33.8 BENIGN PROSTATIC HYPERPLASIA WITH URINARY RETENTION: ICD-10-CM

## 2019-07-09 PROCEDURE — 99308 SBSQ NF CARE LOW MDM 20: CPT | Performed by: NURSE PRACTITIONER

## 2019-07-09 RX ORDER — LIDOCAINE HYDROCHLORIDE 20 MG/ML
JELLY TOPICAL PRN
COMMUNITY
End: 2019-07-11

## 2019-07-09 RX ORDER — NYSTATIN 100000 [USP'U]/G
POWDER TOPICAL 2 TIMES DAILY
COMMUNITY
End: 2019-07-11

## 2019-07-09 ASSESSMENT — MIFFLIN-ST. JEOR: SCORE: 1270.9

## 2019-07-09 NOTE — LETTER
7/9/2019        RE: David Pompa  [address] [address]        [place] [facility]  [place] [facility] Record Number:  [id number]  Place of Service where encounter took place:  [facility] [[id number]]  Chief Complaint   Patient presents with     RECHECK     HPI:    David Pompa  is a 89 year old (7/23/1928), who is being seen today for an episodic care visit.  HPI information obtained from: facility chart records, facility staff, patient report and [facility] chart review.     Met with Dr. Pompa today for follow up.  Unfortunately over the night his hematuria continued and with a attempt at straight cath last night the RN noted resistance and difficulty placing cath and noted >1000 ml output, thus clearly failing his voiding trial, thus cath/Da Silva was left in place.  This AM Dr. Pompa reports he is glad to have the da silva in place, reports he tried to void for a long time last night, could not.  He denies any pain/dysuria currently.  No other complaints.     Past Medical and Surgical History reviewed in Epic today.    MEDICATIONS:  Current Outpatient Medications   Medication Sig Dispense Refill     acetaminophen (TYLENOL) 500 MG tablet Take 1,000 mg by mouth every 6 hours as needed for mild pain       aspirin 81 MG EC tablet Take 81 mg by mouth daily       enoxaparin (LOVENOX) 30 MG/0.3ML syringe Inject 1 mg/kg Subcutaneous every 24 hours Until 7/30/19       hydrochlorothiazide (HYDRODIURIL) 25 MG tablet Take 25 mg by mouth daily       lidocaine (LIDOCAINE 2 %) 2 % external gel as needed for moderate pain Apply to urethra topically as needed for straight cath to be used in conjunction with straight catheterization.       losartan (COZAAR) 100 MG tablet Take 100 mg by mouth daily       multivitamin w/minerals (MULTI-VITAMIN) tablet Take 1 tablet by mouth daily       nystatin (MYCOSTATIN) 440431 UNIT/GM external powder Apply topically 2 times daily  "Apply to groin       omeprazole (PRILOSEC) 40 MG DR capsule Take 40 mg by mouth daily       ondansetron (ZOFRAN) 4 MG tablet Take 1 tablet (4 mg) by mouth every 6 hours as needed for nausea or vomiting       polyethylene glycol (MIRALAX/GLYCOLAX) packet Take 1 packet by mouth daily       pravastatin (PRAVACHOL) 10 MG tablet Take 20 mg by mouth daily       sennosides (SENOKOT) 8.6 MG tablet Take 1 tablet by mouth 2 times daily as needed for constipation       tamsulosin (FLOMAX) 0.4 MG capsule Take 0.4 mg by mouth daily       verapamil (CALAN) 120 MG tablet Take 120 mg by mouth every 12 hours       REVIEW OF SYSTEMS:  7 point ROS done including, light headedness/dizziness, fever/chills, pain, Resp, CV, GI, and  and is negative other than noted in HPI.      Objective:  /67   Pulse 62   Temp 97.2  F (36.2  C)   Resp 20   Ht 1.702 m (5' 7\")   Wt 65.2 kg (143 lb 12.8 oz)   SpO2 94%   BMI 22.52 kg/m        Exam:  GENERAL APPEARANCE:  Elderly male resting in bed, NAD, non-toxic.  RESP:  Regular relaxed breathing effort.  No cough.   EXTREMITIES:  No lower extremity edema, no calf tenderness.   PSYCH: Alert and orientated, pleasant and cooperative.   LINES: New da silva in place, draining, light cherry colored urine, no clots.     Labs:   I have personally reviewed labs, which are in facility or EMR chart.     ASSESSMENT/PLAN:  Gross hematuria  Acute urinary retention  Benign prostatic hyperplasia with urinary retention  Da Silva catheter in place  Reports mild h/o retention but doing well until he had rectal biopsy 6/6, has been significant retention since, requiring da silva.  He was started on Flomax and came out to TCU/rehab with da silva.  We removed da silva and attempted voiding trail starting yesterday 7/8.  Original Da Silva was resistant to come out but did.  Next I+O attempt 6 hr's later difficult and produced gross hematuria blood, which continued to second I+O attempt 5 hrs later, with >1000 ml gross hematuria " output.  Has not been able to void, thus failed voiding trial.  16Fr Cates remains in place, hematuria slowly improving.    I attempted to call his Urologist [place] but his office reports they have not seen him since 2016.    -Continue Flomax for now.  -Continue Cates for now.   --Plan outpatient Urology f/u once discharged.     Orders written by provider at facility  -[facility].     Electronically signed by:  RADHA Rubalcava CNP           Sincerely,        RADHA Rubalcava CNP

## 2019-07-09 NOTE — PROGRESS NOTES
[facility]  [facility] Record Number:  [id number]  Place of Service where encounter took place:  [facility] ([facility]) [[id number]]  Chief Complaint   Patient presents with     RECHECK     HPI:    David Pompa  is a 89 year old (7/23/1928), who is being seen today for an episodic care visit.  HPI information obtained from: facility chart records, facility staff, patient report and [facility] chart review.     Met with Dr. Pompa today for follow up.  Unfortunately over the night his hematuria continued and with a attempt at straight cath last night the RN noted resistance and difficulty placing cath and noted >1000 ml output, thus clearly failing his voiding trial, thus cath/Da Silva was left in place.  This AM Dr. Pompa reports he is glad to have the da silva in place, reports he tried to void for a long time last night, could not.  He denies any pain/dysuria currently.  No other complaints.     Past Medical and Surgical History reviewed in Epic today.    MEDICATIONS:  Current Outpatient Medications   Medication Sig Dispense Refill     acetaminophen (TYLENOL) 500 MG tablet Take 1,000 mg by mouth every 6 hours as needed for mild pain       aspirin 81 MG EC tablet Take 81 mg by mouth daily       enoxaparin (LOVENOX) 30 MG/0.3ML syringe Inject 1 mg/kg Subcutaneous every 24 hours Until 7/30/19       hydrochlorothiazide (HYDRODIURIL) 25 MG tablet Take 25 mg by mouth daily       lidocaine (LIDOCAINE 2 %) 2 % external gel as needed for moderate pain Apply to urethra topically as needed for straight cath to be used in conjunction with straight catheterization.       losartan (COZAAR) 100 MG tablet Take 100 mg by mouth daily       multivitamin w/minerals (MULTI-VITAMIN) tablet Take 1 tablet by mouth daily       nystatin (MYCOSTATIN) 692449 UNIT/GM external powder Apply topically 2 times daily Apply to groin       omeprazole (PRILOSEC) 40 MG DR capsule Take 40 mg by mouth daily        "ondansetron (ZOFRAN) 4 MG tablet Take 1 tablet (4 mg) by mouth every 6 hours as needed for nausea or vomiting       polyethylene glycol (MIRALAX/GLYCOLAX) packet Take 1 packet by mouth daily       pravastatin (PRAVACHOL) 10 MG tablet Take 20 mg by mouth daily       sennosides (SENOKOT) 8.6 MG tablet Take 1 tablet by mouth 2 times daily as needed for constipation       tamsulosin (FLOMAX) 0.4 MG capsule Take 0.4 mg by mouth daily       verapamil (CALAN) 120 MG tablet Take 120 mg by mouth every 12 hours       REVIEW OF SYSTEMS:  7 point ROS done including, light headedness/dizziness, fever/chills, pain, Resp, CV, GI, and  and is negative other than noted in HPI.      Objective:  /67   Pulse 62   Temp 97.2  F (36.2  C)   Resp 20   Ht 1.702 m (5' 7\")   Wt 65.2 kg (143 lb 12.8 oz)   SpO2 94%   BMI 22.52 kg/m       Exam:  GENERAL APPEARANCE:  Elderly male resting in bed, NAD, non-toxic.  RESP:  Regular relaxed breathing effort.  No cough.   EXTREMITIES:  No lower extremity edema, no calf tenderness.   PSYCH: Alert and orientated, pleasant and cooperative.   LINES: New da silva in place, draining, light cherry colored urine, no clots.     Labs:   I have personally reviewed labs, which are in facility or EMR chart.     ASSESSMENT/PLAN:  Gross hematuria  Acute urinary retention  Benign prostatic hyperplasia with urinary retention  Da Silva catheter in place  Reports mild h/o retention but doing well until he had rectal biopsy 6/6, has been significant retention since, requiring da silva.  He was started on Flomax and came out to TCU/rehab with da silva.  We removed da silva and attempted voiding trail starting yesterday 7/8.  Original Da Silva was resistant to come out but did.  Next I+O attempt 6 hr's later difficult and produced gross hematuria blood, which continued to second I+O attempt 5 hrs later, with >1000 ml gross hematuria output.  Has not been able to void, thus failed voiding trial.  16Fr Da Silva remains in place, " hematuria slowly improving.    I attempted to call his Urologist [facility] but his office reports they have not seen him since 2016.    -Continue Flomax for now.  -Continue Cates for now.   --Plan outpatient Urology f/u once discharged.     Orders written by provider at facility  -NNO.     Electronically signed by:  RADHA Rubalcava CNP

## 2019-07-11 ENCOUNTER — DISCHARGE SUMMARY NURSING HOME (OUTPATIENT)
Dept: GERIATRICS | Facility: CLINIC | Age: 84
End: 2019-07-11
Payer: MEDICARE

## 2019-07-11 ENCOUNTER — HOSPITAL LABORATORY (OUTPATIENT)
Dept: OTHER | Facility: CLINIC | Age: 84
End: 2019-07-11

## 2019-07-11 VITALS
HEIGHT: 67 IN | BODY MASS INDEX: 22.57 KG/M2 | DIASTOLIC BLOOD PRESSURE: 80 MMHG | TEMPERATURE: 96.8 F | OXYGEN SATURATION: 99 % | SYSTOLIC BLOOD PRESSURE: 138 MMHG | HEART RATE: 59 BPM | WEIGHT: 143.8 LBS | RESPIRATION RATE: 16 BRPM

## 2019-07-11 DIAGNOSIS — N40.1 BENIGN PROSTATIC HYPERPLASIA WITH URINARY RETENTION: ICD-10-CM

## 2019-07-11 DIAGNOSIS — I10 ESSENTIAL HYPERTENSION: ICD-10-CM

## 2019-07-11 DIAGNOSIS — R31.0 GROSS HEMATURIA: ICD-10-CM

## 2019-07-11 DIAGNOSIS — Z93.3 COLOSTOMY IN PLACE (H): ICD-10-CM

## 2019-07-11 DIAGNOSIS — C80.1 SIGNET RING CELL ADENOCARCINOMA (H): ICD-10-CM

## 2019-07-11 DIAGNOSIS — Z97.8 FOLEY CATHETER IN PLACE: ICD-10-CM

## 2019-07-11 DIAGNOSIS — N18.4 CKD (CHRONIC KIDNEY DISEASE) STAGE 4, GFR 15-29 ML/MIN (H): ICD-10-CM

## 2019-07-11 DIAGNOSIS — Z93.3 S/P COLOSTOMY (H): ICD-10-CM

## 2019-07-11 DIAGNOSIS — R53.81 DEBILITY: ICD-10-CM

## 2019-07-11 DIAGNOSIS — Z98.890 S/P ROBOT-ASSISTED SURGICAL PROCEDURE: Primary | ICD-10-CM

## 2019-07-11 DIAGNOSIS — R33.8 BENIGN PROSTATIC HYPERPLASIA WITH URINARY RETENTION: ICD-10-CM

## 2019-07-11 LAB
ANION GAP SERPL CALCULATED.3IONS-SCNC: 10 MMOL/L (ref 3–14)
BUN SERPL-MCNC: 41 MG/DL (ref 7–30)
CALCIUM SERPL-MCNC: 8.9 MG/DL (ref 8.5–10.1)
CHLORIDE SERPL-SCNC: 100 MMOL/L (ref 94–109)
CO2 SERPL-SCNC: 25 MMOL/L (ref 20–32)
CREAT SERPL-MCNC: 1.68 MG/DL (ref 0.66–1.25)
ERYTHROCYTE [DISTWIDTH] IN BLOOD BY AUTOMATED COUNT: 13.7 % (ref 10–15)
GFR SERPL CREATININE-BSD FRML MDRD: 35 ML/MIN/{1.73_M2}
GLUCOSE SERPL-MCNC: 105 MG/DL (ref 70–99)
HCT VFR BLD AUTO: 32.1 % (ref 40–53)
HGB BLD-MCNC: 10.6 G/DL (ref 13.3–17.7)
MCH RBC QN AUTO: 29.9 PG (ref 26.5–33)
MCHC RBC AUTO-ENTMCNC: 33 G/DL (ref 31.5–36.5)
MCV RBC AUTO: 91 FL (ref 78–100)
PLATELET # BLD AUTO: 344 10E9/L (ref 150–450)
POTASSIUM SERPL-SCNC: 4.3 MMOL/L (ref 3.4–5.3)
RBC # BLD AUTO: 3.54 10E12/L (ref 4.4–5.9)
SODIUM SERPL-SCNC: 135 MMOL/L (ref 133–144)
WBC # BLD AUTO: 8.3 10E9/L (ref 4–11)

## 2019-07-11 PROCEDURE — 99316 NF DSCHRG MGMT 30 MIN+: CPT | Performed by: NURSE PRACTITIONER

## 2019-07-11 RX ORDER — BACITRACIN ZINC 500 [USP'U]/G
OINTMENT TOPICAL DAILY
Start: 2019-07-11

## 2019-07-11 ASSESSMENT — MIFFLIN-ST. JEOR: SCORE: 1270.9

## 2019-07-11 NOTE — PROGRESS NOTES
[facility] DISCHARGE SUMMARY  PATIENT'S NAME: David Pompa  YOB: 1928  MEDICAL RECORD NUMBER:  [id number]  Place of Service where encounter took place:  [facility] [[id number]]    PRIMARY CARE PROVIDER AND CLINIC RESPONSIBLE AFTER TRANSFER:   [facility] [address] / [place] [address]    Assisted Living: [facility]     Transferring providers: RADHA Rubalcava CNP, Rukhsana Bell MD  Recent Hospitalization/ED:  [facility]  stay 6/25/2019 to 7/1/2019.  Date of SNF Admission: July / 01 / 2019  Date of SNF (anticipated) Discharge: July / 12 / 2019  Discharged to: new assisted living for patient [facility]  Cognitive Scores: SLUMS: 26/30 and CPT: 4.7/5.6  Physical Function: Pedroza Balance Scale: 49/56 and TUG: Score 10.9 sec. Low fall risk.   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  Met with several teams and underwent Robotic assisted abdominal perirectal tumor resection surgery and creation of a end sigmoid colostomy 25 June.  Noted to be doing well, thus has transitioned to the TCU for ongoing cares and PT/OT.     While at the TCU Dr. 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  It did take some days before he had stool output from his new end sigmoid colostomy but finally did.  He was started on Flomax in hospital due to acute on chronic urine retention.  We attempted a voiding trial roughly 1 week after, but he developed gross hematuria on first straight cath, was not able to void, had large volume retention and placing new cath was difficult.  Thus decision  was to keep with Da Silva in place, for consideration of repeat voiding trial after a few more weeks recovery.  Hematuria has been improving but not fully resolved.  He has been learning Ostomy, Da Silva cares and Lovenox injections.  From a PT/OT standpoint, he was up and independent from day one, doing well.  Thus he will transition back to the [facility] with [facility] care.      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  Is glad to be returning home soon.      S/P robot-assisted surgical procedure  S/P colostomy (H)  Colostomy in place (H)  Signet ring cell adenocarcinoma (H)  New ostomy doing well.  Output is on the more pasty/loose side, but we have been encouraging/recommding a more soft output for several weeks until ostomy fully healed, then he can reduce softeners and go for more formed output.  He remains on low fiber diet per surgery.   Laparoscopic sites healing nicely.   -He does have a old JEROME drain site just Left of the ostomy.  It is still open but no drainage, no s/s of infection, using Bacitracin on it until healed.   -Miralax and Senna PRN for now.   -Has f/u with Surgery 7/16.     Per Surgery, he continues on daily Lovenox injection for prophylaxis, last dose 30 July.     Benign prostatic hyperplasia with urinary retention  Da Silva catheter in place  Gross hematuria  Has a h/o urine retention, was seen a few years ago for it.  He noted he was doing well at home until 6/6 when he underwent the biopsy.  Reports the acute part of retention started after the biopsy.  Went on Flomax at some point due to retention in hospital.  Came to TCU with da silva in place, instructions to attempt voiding trail 1 week after which we did.     Original Da Silva was resistant to come out but did.  Next I+O attempt 6 hr's later difficult and produced gross hematuria blood, which continued to second I+O attempt 5 hrs later, with >1000 ml gross hematuria output.  Has not been able to void, thus failed  voiding trial, and he admitted he did not want to keep up with straight cath's at this time, thus 16Fr Da Silva remains in place, hematuria slowly improving.   Hgb was 10.9 pre-hematuria on 7/8, was 10.6 on 7/11 prior to discharge.   -Instructed to f/u with Urology in next couple weeks.   -Would recommend da silva exchange q monthly for now, last done 9 July.     Essential hypertension  CKD (chronic kidney disease) stage 4, GFR 15-29 ml/min (H)  Review of VS's show VSS and within acceptable range.   No current s/s of clinical CHF.   We do note creatine baseline 1.5-1.7 historically, recently more 1.7-1.8.  Creatine was 1.85 on 7/8 and 1.68 on 7/11.  With creatine of 1.68 his CrCl is around 27.  He notes SBP's were low in the past with Flomax, but for us SBP's 110-140's, is asymptomatic, thus we did not make any changes at this time.   -PCP to consider/review Losartan use in setting of CKD IV.   -Continues on Hydrochlorothiazide, Verapamil, Statin, ASA.   -Flomax as above.     Debility  -Will DC with home care.   -See below for F2F.     Past Medical History:  has no past medical history on file.    Discharge Medications:  Current Outpatient Medications   Medication Sig Dispense Refill     acetaminophen (TYLENOL) 500 MG tablet Take 1,000 mg by mouth every 6 hours as needed for mild pain       aspirin 81 MG EC tablet Take 81 mg by mouth daily       bacitracin 500 UNIT/GM external ointment Apply topically daily To JEROME site Left of ostomy until approximated/healed.       enoxaparin (LOVENOX) 30 MG/0.3ML syringe Inject 1 mg/kg Subcutaneous every 24 hours Until 7/30/19       hydrochlorothiazide (HYDRODIURIL) 25 MG tablet Take 25 mg by mouth daily       losartan (COZAAR) 100 MG tablet Take 100 mg by mouth daily       multivitamin w/minerals (MULTI-VITAMIN) tablet Take 1 tablet by mouth daily       omeprazole (PRILOSEC) 40 MG DR capsule Take 40 mg by mouth daily       ondansetron (ZOFRAN) 4 MG tablet Take 1 tablet (4 mg) by mouth  "every 6 hours as needed for nausea or vomiting       polyethylene glycol (MIRALAX/GLYCOLAX) packet Take 1 packet by mouth daily as needed       pravastatin (PRAVACHOL) 10 MG tablet Take 20 mg by mouth daily       sennosides (SENOKOT) 8.6 MG tablet Take 1 tablet by mouth 2 times daily as needed for constipation       tamsulosin (FLOMAX) 0.4 MG capsule Take 0.4 mg by mouth daily       verapamil (CALAN) 120 MG tablet Take 120 mg by mouth every 12 hours       Medication Changes/Rationale:   -As noted above.     Controlled medications sent with patient:   not applicable/none     ROS:   7 point ROS done including, light headedness/dizziness, fever/chills, pain, Resp, CV, GI, and  and is negative other than noted in HPI.      Physical Exam:   Vitals: /80   Pulse 59   Temp 96.8  F (36  C)   Resp 16   Ht 1.702 m (5' 7\")   Wt 65.2 kg (143 lb 12.8 oz)   SpO2 99%   BMI 22.52 kg/m    BMI= Body mass index is 22.52 kg/m .     GENERAL APPEARANCE:  Elderly male sitting up in chair, NAD, non-toxic.  ENT:  Mouth and oropharynx normal, moist mucous membranes.   RESP:  Lungs CTA today.  Regular relaxed breathing effort.  No cough.   CV: S1/S2 no murmur or rubs.  Regular rhythm and rate, few extra beats.  No generalized edema.   ABDOMEN:   Flat, soft, non-tender with hypo-active bowel sounds.  No guarding, rigidity, or rebound tenderness.  New colostomy present, ostomy is pink, budded, slightly swollen with, good amount of brown pasty stool present.    EXTREMITIES:  No lower extremity edema, no calf tenderness.   PSYCH: Alert and orientated, pleasant and cooperative.   WOUNDS:  Several laproscopic sites on abdomen, all approximated, no s/s of infection.   Left abdominal old JEROME site which is not healed, not approximated, but no s/s of infection.   Perirectal incision, roughly 10 cm long, approximated with stitches, no s/s of infection.  Slightly reddened superior but no skin breakdown.      SNF labs: Labs done in SNF are in " [employer]. Please refer to them using EPIC/Care Everywhere.    DISCHARGE PLAN:    Follow up labs: No labs orders/due, but PCP to monitor creatine/Hgb per their discretion.     Medical Follow Up:      Follow up with primary care provider in 1-2 weeks   Follow up with Colorectal surgery as instructed.    We recommended f/u with Urology in 1-2 weeks      MTM referral needed and placed by this provider: No      Discharge Services: Home Care:  Occupational Therapy, Physical Therapy, Registered Nurse, Home Health Aide and From:  [place]    Discharge Instructions Verbalized to Patient at Discharge:     Instructed patient to arrange/attend above appointments.     TOTAL DISCHARGE TIME:   Greater than 30 minutes  Electronically signed by:  RADHA Rubalcava CNP     Documentation of Face to Face and Certification for Home Health Services    I certify that patient: David CISNEROS Pompa is under my care and that I, or a nurse practitioner or physician's assistant working with me, had a face-to-face encounter that meets the physician face-to-face encounter requirements with this patient on: 11 July 19.    This encounter with the patient was in whole, or in part, for the following medical condition, which is the primary reason for home health care: Recent rectal surgery, new colostomy, need for Lovenox injections, new Cates, debility.    I certify that, based on my findings, the following services are medically necessary home health services: Nursing, Occupational Therapy, Physical Therapy and SW and HHA. .    My clinical findings support the need for the above services because: Nurse is needed: To provide assessment and oversight required in the home to assure adherence to the medical plan due to: Recent rectal surgery, new colostomy, need for Lovenox injections, new Cates, debility..., Occupational Therapy Services are needed to assess and treat cognitive ability and address ADL safety due to impairment in Recent rectal  surgery, new colostomy, need for Lovenox injections, new Cates, debility.. and Physical Therapy Services are needed to assess and treat the following functional impairments: Recent rectal surgery, new colostomy, need for Lovenox injections, new Cates, debility..    Further, I certify that my clinical findings support that this patient is homebound (i.e. absences from home require considerable and taxing effort and are for medical reasons or [employer] services or infrequently or of short duration when for other reasons) because: Requires assistance of another person or specialized equipment to access medical services because patient: Requires supervision of another for safe transfer...    Based on the above findings. I certify that this patient is confined to the home and needs intermittent skilled nursing care, physical therapy and/or speech therapy.  The patient is under my care, and I have initiated the establishment of the plan of care.  This patient will be followed by a physician who will periodically review the plan of care.  Physician/Provider to provide follow up care: Nataly Guadarrama    Attending hospital physician (the Medicare certified RICHARD provider):   Electronically signed by  RADHA Anderson CNP  [facility]    Physician Signature: See electronic signature associated with these discharge orders.  Date: 7/11/2019

## 2019-07-11 NOTE — LETTER
7/11/2019        RE: David Pompa  [address] [facility] [address]        [facility] DISCHARGE SUMMARY  PATIENT'S NAME: David Pompa  YOB: 1928  MEDICAL RECORD NUMBER:  [id number]  Place of Service where encounter took place:  [facility] [[id number]]    PRIMARY CARE PROVIDER AND CLINIC RESPONSIBLE AFTER TRANSFER:   [facility] [address] [place] [address] / [facility]    Assisted Living: [facility]     Transferring providers: RADHA Rubalcava CNP, Rukhsana Bell MD  Recent Hospitalization/ED:  [facility]  stay 6/25/2019 to 7/1/2019.  Date of SNF Admission: July / 01 / 2019  Date of SNF (anticipated) Discharge: July / 12 / 2019  Discharged to: new assisted living for patient [facility]  Cognitive Scores: SLUMS: 26/30 and CPT: 4.7/5.6  Physical Function: Pedroza Balance Scale: 49/56 and TUG: Score 10.9 sec. Low fall risk.   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  Met with several teams and underwent Robotic assisted abdominal perirectal tumor resection surgery and creation of a end sigmoid colostomy 25 June.  Noted to be doing well, thus has transitioned to the TCU for ongoing cares and PT/OT.     While at the TCU Dr. 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  It did take some days before he had stool output from his new end sigmoid colostomy but finally did.  He was started on Flomax in hospital due to acute on chronic urine retention.  We attempted a voiding trial roughly 1 week after, but he developed gross hematuria on first straight cath, was not  able to void, had large volume retention and placing new cath was difficult.  Thus decision was to keep with Da Silva in place, for consideration of repeat voiding trial after a few more weeks recovery.  Hematuria has been improving but not fully resolved.  He has been learning Ostomy, Da Silva cares and Lovenox injections.  From a PT/OT standpoint, he was up and independent from day one, doing well.  Thus he will transition back to the [facility] with home care.      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  Is glad to be returning home soon.      S/P robot-assisted surgical procedure  S/P colostomy (H)  Colostomy in place (H)  Signet ring cell adenocarcinoma (H)  New ostomy doing well.  Output is on the more pasty/loose side, but we have been encouraging/recommding a more soft output for several weeks until ostomy fully healed, then he can reduce softeners and go for more formed output.  He remains on low fiber diet per surgery.   Laparoscopic sites healing nicely.   -He does have a old JEROME drain site just Left of the ostomy.  It is still open but no drainage, no s/s of infection, using Bacitracin on it until healed.   -Miralax and Senna PRN for now.   -Has f/u with Surgery 7/16.     Per Surgery, he continues on daily Lovenox injection for prophylaxis, last dose 30 July.     Benign prostatic hyperplasia with urinary retention  Da Silva catheter in place  Gross hematuria  Has a h/o urine retention, was seen a few years ago for it.  He noted he was doing well at home until 6/6 when he underwent the biopsy.  Reports the acute part of retention started after the biopsy.  Went on Flomax at some point due to retention in hospital.  Came to TCU with da silva in place, instructions to attempt voiding trail 1 week after which we did.     Original Da Silva was resistant to come out but did.  Next I+O attempt 6 hr's later difficult and produced gross hematuria blood, which continued to second I+O  attempt 5 hrs later, with >1000 ml gross hematuria output.  Has not been able to void, thus failed voiding trial, and he admitted he did not want to keep up with straight cath's at this time, thus 16Fr Da Silva remains in place, hematuria slowly improving.   Hgb was 10.9 pre-hematuria on 7/8, was 10.6 on 7/11 prior to discharge.   -Instructed to f/u with Urology in next couple weeks.   -Would recommend da silva exchange q monthly for now, last done 9 July.     Essential hypertension  CKD (chronic kidney disease) stage 4, GFR 15-29 ml/min (H)  Review of VS's show VSS and within acceptable range.   No current s/s of clinical CHF.   We do note creatine baseline 1.5-1.7 historically, recently more 1.7-1.8.  Creatine was 1.85 on 7/8 and 1.68 on 7/11.  With creatine of 1.68 his CrCl is around 27.  He notes SBP's were low in the past with Flomax, but for us SBP's 110-140's, is asymptomatic, thus we did not make any changes at this time.   -PCP to consider/review Losartan use in setting of CKD IV.   -Continues on Hydrochlorothiazide, Verapamil, Statin, ASA.   -Flomax as above.     Debility  -Will DC with home care.   -See below for F2F.     Past Medical History:  has no past medical history on file.    Discharge Medications:  Current Outpatient Medications   Medication Sig Dispense Refill     acetaminophen (TYLENOL) 500 MG tablet Take 1,000 mg by mouth every 6 hours as needed for mild pain       aspirin 81 MG EC tablet Take 81 mg by mouth daily       bacitracin 500 UNIT/GM external ointment Apply topically daily To JEROME site Left of ostomy until approximated/healed.       enoxaparin (LOVENOX) 30 MG/0.3ML syringe Inject 1 mg/kg Subcutaneous every 24 hours Until 7/30/19       hydrochlorothiazide (HYDRODIURIL) 25 MG tablet Take 25 mg by mouth daily       losartan (COZAAR) 100 MG tablet Take 100 mg by mouth daily       multivitamin w/minerals (MULTI-VITAMIN) tablet Take 1 tablet by mouth daily       omeprazole (PRILOSEC) 40 MG   "capsule Take 40 mg by mouth daily       ondansetron (ZOFRAN) 4 MG tablet Take 1 tablet (4 mg) by mouth every 6 hours as needed for nausea or vomiting       polyethylene glycol (MIRALAX/GLYCOLAX) packet Take 1 packet by mouth daily as needed       pravastatin (PRAVACHOL) 10 MG tablet Take 20 mg by mouth daily       sennosides (SENOKOT) 8.6 MG tablet Take 1 tablet by mouth 2 times daily as needed for constipation       tamsulosin (FLOMAX) 0.4 MG capsule Take 0.4 mg by mouth daily       verapamil (CALAN) 120 MG tablet Take 120 mg by mouth every 12 hours       Medication Changes/Rationale:   -As noted above.     Controlled medications sent with patient:   not applicable/none     ROS:   7 point ROS done including, light headedness/dizziness, fever/chills, pain, Resp, CV, GI, and  and is negative other than noted in HPI.      Physical Exam:   Vitals: /80   Pulse 59   Temp 96.8  F (36  C)   Resp 16   Ht 1.702 m (5' 7\")   Wt 65.2 kg (143 lb 12.8 oz)   SpO2 99%   BMI 22.52 kg/m     BMI= Body mass index is 22.52 kg/m .     GENERAL APPEARANCE:  Elderly male sitting up in chair, NAD, non-toxic.  ENT:  Mouth and oropharynx normal, moist mucous membranes.   RESP:  Lungs  CTA today.  Regular relaxed breathing effort.  No cough.   CV: S1/S2 no murmur or rubs.  Regular rhythm and rate, few extra beats.  No generalized edema.   ABDOMEN:   Flat, soft, non-tender with hypo-active bowel sounds.  No guarding, rigidity, or rebound tenderness.  New colostomy present, ostomy is pink, budded, slightly swollen with, good amount of brown pasty stool present.    EXTREMITIES:  No lower extremity edema, no calf tenderness.   PSYCH: Alert and orientated, pleasant and cooperative.   WOUNDS:  Several laproscopic sites on abdomen, all approximated, no s/s of infection.   Left abdominal old JEROME site which is not healed, not approximated, but no s/s of infection.   Perirectal incision, roughly 10 cm long, approximated with stitches, no " s/s of infection.  Slightly reddened superior but no skin breakdown.      SNF labs: Labs done in SNF are in [facility] EPIC. Please refer to them using EPIC/Care Everywhere.    DISCHARGE PLAN:    Follow up labs: No labs orders/due, but PCP to monitor creatine/Hgb per their discretion.     Medical Follow Up:      Follow up with primary care provider in 1-2 weeks   Follow up with Colorectal surgery as instructed.    We recommended f/u with Urology in 1-2 weeks      MTM referral needed and placed by this provider: No      Discharge Services: Home Care:  Occupational Therapy, Physical Therapy, Registered Nurse, Home Health Aide and From:  [facility]    Discharge Instructions Verbalized to Patient at Discharge:     Instructed patient to arrange/attend above appointments.     TOTAL DISCHARGE TIME:   Greater than 30 minutes  Electronically signed by:  RADHA Rubalcava CNP     Documentation of Face to Face and Certification for Home Health Services    I certify that patient: David MD Peña is under my care and that I, or a nurse practitioner or physician's assistant working with me, had a face-to-face encounter that meets the physician face-to-face encounter requirements with this patient on: 11 July 19.    This encounter with the patient was in whole, or in part, for the following medical condition, which is the primary reason for home health care: Recent rectal surgery, new colostomy, need for Lovenox injections, new Cates, debility.    I certify that, based on my findings, the following services are medically necessary home health services: Nursing, Occupational Therapy, Physical Therapy and SW and HHA. .    My clinical findings support the need for the above services because: Nurse is needed: To provide assessment and oversight required in the home to assure adherence to the medical plan due to: Recent rectal surgery, new colostomy, need for Lovenox injections, new Cates, debility..., Occupational Therapy Services are  needed to assess and treat cognitive ability and address ADL safety due to impairment in Recent rectal surgery, new colostomy, need for Lovenox injections, new Cates, debility.. and Physical Therapy Services are needed to assess and treat the following functional impairments: Recent rectal surgery, new colostomy, need for Lovenox injections, new Cates, debility..    Further, I certify that my clinical findings support that this patient is homebound (i.e. absences from home require considerable and taxing effort and are for medical reasons or [facility] services or infrequently or of short duration when for other reasons) because: Requires assistance of another person or specialized equipment to access medical services because patient: Requires supervision of another for safe transfer...    Based on the above findings. I certify that this patient is confined to the home and needs intermittent skilled nursing care, physical therapy and/or speech therapy.  The patient is under my care, and I have initiated the establishment of the plan of care.  This patient will be followed by a physician who will periodically review the plan of care.  Physician/Provider to provide follow up care: Nataly Guadarrama    Attending hospital physician (the Medicare certified PECOS provider):   Electronically signed by  RADHA Anderson, CNP  [facility]    Physician Signature: See electronic signature associated with these discharge orders.  Date: 7/11/2019            Sincerely,        RADHA Rubalcava CNP